# Patient Record
Sex: FEMALE | Race: WHITE
[De-identification: names, ages, dates, MRNs, and addresses within clinical notes are randomized per-mention and may not be internally consistent; named-entity substitution may affect disease eponyms.]

---

## 2021-07-05 ENCOUNTER — HOSPITAL ENCOUNTER (EMERGENCY)
Dept: HOSPITAL 75 - ER | Age: 51
Discharge: HOME | End: 2021-07-05
Payer: COMMERCIAL

## 2021-07-05 VITALS — WEIGHT: 195.11 LBS | BODY MASS INDEX: 32.51 KG/M2 | HEIGHT: 65 IN

## 2021-07-05 VITALS — DIASTOLIC BLOOD PRESSURE: 76 MMHG | SYSTOLIC BLOOD PRESSURE: 133 MMHG

## 2021-07-05 DIAGNOSIS — L50.9: Primary | ICD-10-CM

## 2021-07-05 DIAGNOSIS — S70.262A: ICD-10-CM
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
## 2021-07-05 LAB
ALBUMIN SERPL-MCNC: 4 GM/DL (ref 3.2–4.5)
ALP SERPL-CCNC: 61 U/L (ref 40–136)
ALT SERPL-CCNC: 8 U/L (ref 0–55)
BASOPHILS # BLD AUTO: 0 10^3/UL (ref 0–0.1)
BASOPHILS NFR BLD AUTO: 0 % (ref 0–10)
BILIRUB SERPL-MCNC: 0.4 MG/DL (ref 0.1–1)
BUN/CREAT SERPL: 16
CALCIUM SERPL-MCNC: 8.8 MG/DL (ref 8.5–10.1)
CHLORIDE SERPL-SCNC: 108 MMOL/L (ref 98–107)
CO2 SERPL-SCNC: 21 MMOL/L (ref 21–32)
CREAT SERPL-MCNC: 0.74 MG/DL (ref 0.6–1.3)
EOSINOPHIL # BLD AUTO: 0.1 10^3/UL (ref 0–0.3)
EOSINOPHIL NFR BLD AUTO: 1 % (ref 0–10)
GFR SERPLBLD BASED ON 1.73 SQ M-ARVRAT: > 60 ML/MIN
GLUCOSE SERPL-MCNC: 111 MG/DL (ref 70–105)
HCT VFR BLD CALC: 45 % (ref 35–52)
HGB BLD-MCNC: 15.1 G/DL (ref 11.5–16)
LYMPHOCYTES # BLD AUTO: 1.9 10^3/UL (ref 1–4)
LYMPHOCYTES NFR BLD AUTO: 27 % (ref 12–44)
MANUAL DIFFERENTIAL PERFORMED BLD QL: NO
MCH RBC QN AUTO: 30 PG (ref 25–34)
MCHC RBC AUTO-ENTMCNC: 33 G/DL (ref 32–36)
MCV RBC AUTO: 89 FL (ref 80–99)
MONOCYTES # BLD AUTO: 0.3 10^3/UL (ref 0–1)
MONOCYTES NFR BLD AUTO: 4 % (ref 0–12)
NEUTROPHILS # BLD AUTO: 5 10^3/UL (ref 1.8–7.8)
NEUTROPHILS NFR BLD AUTO: 69 % (ref 42–75)
PLATELET # BLD: 239 10^3/UL (ref 130–400)
PMV BLD AUTO: 10 FL (ref 9–12.2)
POTASSIUM SERPL-SCNC: 3.9 MMOL/L (ref 3.6–5)
PROT SERPL-MCNC: 7.1 GM/DL (ref 6.4–8.2)
SODIUM SERPL-SCNC: 141 MMOL/L (ref 135–145)
WBC # BLD AUTO: 7.2 10^3/UL (ref 4.3–11)

## 2021-07-05 PROCEDURE — 86666 EHRLICHIA ANTIBODY: CPT

## 2021-07-05 PROCEDURE — 36415 COLL VENOUS BLD VENIPUNCTURE: CPT

## 2021-07-05 PROCEDURE — 86618 LYME DISEASE ANTIBODY: CPT

## 2021-07-05 PROCEDURE — 86757 RICKETTSIA ANTIBODY: CPT

## 2021-07-05 PROCEDURE — 85025 COMPLETE CBC W/AUTO DIFF WBC: CPT

## 2021-07-05 PROCEDURE — 86141 C-REACTIVE PROTEIN HS: CPT

## 2021-07-05 PROCEDURE — 86668 FRANCISELLA TULARENSIS: CPT

## 2021-07-05 PROCEDURE — 80053 COMPREHEN METABOLIC PANEL: CPT

## 2021-07-05 NOTE — ED GENERAL
General


Chief Complaint:  Allergic Reaction


Stated Complaint:  HIVES


Nursing Triage Note:  


PT ARRIVED BY PRIVATE VEHICLE WITH CHIEF COMPLAINT OF HIVES. PT WAS ALERT, 


ORIENTED X 4 AND AMBULATORY. PT STATED THE HIVES STARTED YESTERDAY MORNING. SHE 


HAS BEEN TAKING BENEDRYL AND IT IS NOT WORKING ANYMORE. THE LAST TIME SHE TOOK 


BENADRYL WAS 1 HOUR PRIOR TO ARRIVAL. PT HAS HISTORY OF HIVES ON AND OFF. UNSURE




WHAT TRIGGERED HER HIVES. PT'S VITALS WER DONE ON ARRIVAL.


Source of Information:  Patient


Exam Limitations:  No Limitations





History of Present Illness


Date Seen by Provider:  Jul 5, 2021


Time Seen by Provider:  09:56


Initial Comments


This 51-year-old woman presents to the emergency room with complaints of diffuse

hives and itching.  She does not know what triggers them.  She has multiple 

episodes per year typically.  This episode seems to be more severe that the 

other episode she has experienced.  She has been taking Benadryl but it does not

seem to be effective any longer.  She denies any tongue, lip, or throat swelling

or shortness of breath.  She also reports having a tick bite over her left hip 

with a target lesion a couple of weeks ago.





Allergies and Home Medications


Allergies


Coded Allergies:  


     Penicillins (Verified  Allergy, Unknown, 7/5/21)


     Sulfa (Sulfonamide Antibiotics) (Verified  Allergy, Unknown, 7/5/21)





Home Medications


Doxycycline Hyclate 100 Mg Tablet, 100 MG PO BID


   Prescribed by: JULIUS WOODARD on 7/5/21 1119


Epinephrine 0.3 Mg/0.3 Ml Auto.injct, 0.3 MG IJ ONCE PRN for SHORTNESS OF BREATH


   For anaphylaxis 


   Prescribed by: JULIUS WOODARD on 7/5/21 1119


Prednisone 20 Mg Tab, 20 MG PO BID


   Prescribed by: JULIUS WOODARD on 7/5/21 1119





Patient Home Medication List


Home Medication List Reviewed:  Yes





Review of Systems


Review of Systems


Constitutional:  no symptoms reported


EENTM:  no symptoms reported


Respiratory:  no symptoms reported


Cardiovascular:  no symptoms reported


Gastrointestinal:  no symptoms reported


Genitourinary:  no symptoms reported


Pregnant:  No


Musculoskeletal:  no symptoms reported


Skin:  see HPI


Psychiatric/Neurological:  No Symptoms Reported


Hematologic/Lymphatic:  No Symptoms Reported


Immunological/Allergic:  see HPI





Past Medical-Social-Family Hx


Patient Social History


Tobacco Use?:  No


Smoking Status:  Never a Smoker


Use of E-Cig and/or Vaping Juan:  Never a User


Substance use?:  No


Alcohol Use?:  No


Pt feels they are or have been:  No





Past Medical History


Surgeries:  Yes


Gallbladder, Orthopedic


Respiratory:  No


Cardiac:  No


Neurological:  No


Pregnant:  No


Reproductive Disorders:  No


Genitourinary:  No


Gastrointestinal:  No


Musculoskeletal:  No


Endocrine:  No


HEENT:  No


Cancer:  No


Psychosocial:  No


Integumentary:  Yes (Recurrent hives without known trigger)





Physical Exam


Vital Signs





Vital Signs - First Documented








 7/5/21





 09:20


 


Temp 35.7


 


Pulse 80


 


Resp 18


 


B/P (MAP) 133/76 (95)


 


Pulse Ox 100


 


O2 Delivery Room Air





Capillary Refill : Less Than 3 Seconds


Height, Weight, BMI


Height: '"


Weight: lbs. oz. kg; 32.00 BMI


Method:


General Appearance:  No Apparent Distress, WD/WN


HEENT:  PERRL/EOMI, Normal ENT Inspection, Pharynx Normal


Neck:  Normal Inspection


Respiratory:  Lungs Clear, Normal Breath Sounds, No Accessory Muscle Use, No 

Respiratory Distress


Cardiovascular:  Regular Rate, Rhythm, No Edema, No Murmur


Gastrointestinal:  Non Tender, Soft


Extremity:  Normal Inspection, No Pedal Edema


Neurologic/Psychiatric:  Alert, Oriented x3, No Motor/Sensory Deficits, Normal 

Mood/Affect, CNs II-XII Norm as Tested


Skin:  Warm/Dry, Other (Diffuse hives, dusky quarter sized lesion on the left 

hip at site of tick bite)





Progress/Results/Core Measures


Suspected Sepsis


SIRS


Temperature: 


Pulse: 80 


Respiratory Rate: 18


 


Laboratory Tests


7/5/21 10:08: White Blood Count 7.2


Blood Pressure 133 /76 


Mean: 95


 





Laboratory Tests


7/5/21 10:08: 


Creatinine 0.74, Platelet Count 239, Total Bilirubin 0.4








Results/Orders


Lab Results





Laboratory Tests








Test


 7/5/21


10:08 Range/Units


 


 


White Blood Count


 7.2 


 4.3-11.0


10^3/uL


 


Red Blood Count


 5.09 


 3.80-5.11


10^6/uL


 


Hemoglobin 15.1  11.5-16.0  g/dL


 


Hematocrit 45  35-52  %


 


Mean Corpuscular Volume 89  80-99  fL


 


Mean Corpuscular Hemoglobin 30  25-34  pg


 


Mean Corpuscular Hemoglobin


Concent 33 


 32-36  g/dL





 


Red Cell Distribution Width 13.0  10.0-14.5  %


 


Platelet Count


 239 


 130-400


10^3/uL


 


Mean Platelet Volume 10.0  9.0-12.2  fL


 


Immature Granulocyte % (Auto) 0   %


 


Neutrophils (%) (Auto) 69  42-75  %


 


Lymphocytes (%) (Auto) 27  12-44  %


 


Monocytes (%) (Auto) 4  0-12  %


 


Eosinophils (%) (Auto) 1  0-10  %


 


Basophils (%) (Auto) 0  0-10  %


 


Neutrophils # (Auto)


 5.0 


 1.8-7.8


10^3/uL


 


Lymphocytes # (Auto)


 1.9 


 1.0-4.0


10^3/uL


 


Monocytes # (Auto)


 0.3 


 0.0-1.0


10^3/uL


 


Eosinophils # (Auto)


 0.1 


 0.0-0.3


10^3/uL


 


Basophils # (Auto)


 0.0 


 0.0-0.1


10^3/uL


 


Immature Granulocyte # (Auto)


 0.0 


 0.0-0.1


10^3/uL


 


Sodium Level 141  135-145  MMOL/L


 


Potassium Level 3.9  3.6-5.0  MMOL/L


 


Chloride Level 108 H   MMOL/L


 


Carbon Dioxide Level 21  21-32  MMOL/L


 


Anion Gap 12  5-14  MMOL/L


 


Blood Urea Nitrogen 12  7-18  MG/DL


 


Creatinine


 0.74 


 0.60-1.30


MG/DL


 


Estimat Glomerular Filtration


Rate > 60 


  





 


BUN/Creatinine Ratio 16   


 


Glucose Level 111 H   MG/DL


 


Calcium Level 8.8  8.5-10.1  MG/DL


 


Corrected Calcium 8.8  8.5-10.1  MG/DL


 


Total Bilirubin 0.4  0.1-1.0  MG/DL


 


Aspartate Amino Transf


(AST/SGOT) 10 


 5-34  U/L





 


Alanine Aminotransferase


(ALT/SGPT) 8 


 0-55  U/L





 


Alkaline Phosphatase 61    U/L


 


C-Reactive Protein High


Sensitivity 1.06 H


 0.00-0.50


MG/DL


 


Total Protein 7.1  6.4-8.2  GM/DL


 


Albumin 4.0  3.2-4.5  GM/DL








My Orders





Orders - JULIUS MARINA MD


Cbc With Automated Diff (7/5/21 10:03)


Comprehensive Metabolic Panel (7/5/21 10:03)


Hs C Reactive Protein (7/5/21 10:03)


Tick Panel With Lyme Eia (7/5/21 10:03)


Ed Iv/Invasive Line Start (7/5/21 10:03)


Famotidine Injection (Pepcid Injection) (7/5/21 10:15)


Methylprednisolone Sod Succ (Solu-Medrol (7/5/21 10:15)





Medications Given in ED





Current Medications








 Medications  Dose


 Ordered  Sig/Oly


 Route  Start Time


 Stop Time Status Last Admin


Dose Admin


 


 Famotidine  20 mg  ONCE  ONCE


 IVP  7/5/21 10:15


 7/5/21 10:16 DC 7/5/21 10:22


20 MG


 


 Methylprednisolone


 Sodium Succinate  125 mg  ONCE  ONCE


 IVP  7/5/21 10:15


 7/5/21 10:16 DC 7/5/21 10:22


125 MG








Vital Signs/I&O











 7/5/21 7/5/21





 09:20 11:25


 


Temp 35.7 


 


Pulse 80 64


 


Resp 18 16


 


B/P (MAP) 133/76 (95) 133/76


 


Pulse Ox 100 97


 


O2 Delivery Room Air Room Air





Capillary Refill : Less Than 3 Seconds








Blood Pressure Mean:                    95








Progress Note :  


Progress Note


Labs were obtained including a tick panel.  Pepcid and Solu-Medrol were 

administered.  I advised treating for possible tickborne illness.  I also 

prescribed an EpiPen since patient will be traveling out of state today.  See 

discharge instructions.





Departure


Impression





   Primary Impression:  


   Hives


   Additional Impression:  


   Tick bite


   Qualified Codes:  W57.XXXA - Bitten or stung by nonvenomous insect and other 

   nonvenomous arthropods, initial encounter


Disposition:  01 HOME, SELF-CARE


Condition:  Improved





Departure-Patient Inst.


Decision time for Depature:  11:13


Referrals:  


SHANI CASTRO MD (PCP/Family)


Primary Care Physician


Patient Instructions:  Hives, Tickborne Encephalitis





Add. Discharge Instructions:  


You may continue taking Benadryl up to 50 mg every 4 hours as needed for itching

and hives.


Also take the steroids as prescribed over the next 4 days.


Take EpiPen and return to emergency services if you develop swelling of the 

mouth, lips, throat, or difficulty breathing.


Call with questions or concerns.


Follow-up with your primary care provider soon as possible.


Complete your antibiotics as prescribed and follow-up with your primary care 

provider to review tick panel results in about 4 or 5 days. 





All discharge instructions reviewed with patient and/or family. Voiced 

understanding.


Scripts


Epinephrine (Epipen) 0.3 Mg/0.3 Ml Auto.injct


0.3 MG IJ ONCE PRN for SHORTNESS OF BREATH, #1 ML


   For anaphylaxis


   Prov: JULIUS MARINA MD         7/5/21 


Prednisone (Prednisone) 20 Mg Tab


20 MG PO BID, #8 TAB 0 Refills


   Prov: JULIUS MARINA MD         7/5/21 


Doxycycline Hyclate (Doxycycline Hyclate) 100 Mg Tablet


100 MG PO BID, #20 TAB 0 Refills


   Prov: JULIUS MARINA MD         7/5/21











JULIUS MARINA MD         Jul 5, 2021 10:14